# Patient Record
Sex: FEMALE | Race: WHITE | NOT HISPANIC OR LATINO | Employment: UNEMPLOYED | URBAN - METROPOLITAN AREA
[De-identification: names, ages, dates, MRNs, and addresses within clinical notes are randomized per-mention and may not be internally consistent; named-entity substitution may affect disease eponyms.]

---

## 2017-05-16 ENCOUNTER — OFFICE VISIT (OUTPATIENT)
Dept: URGENT CARE | Facility: CLINIC | Age: 27
End: 2017-05-16
Payer: COMMERCIAL

## 2017-05-16 ENCOUNTER — HOSPITAL ENCOUNTER (OUTPATIENT)
Dept: RADIOLOGY | Facility: CLINIC | Age: 27
Discharge: HOME | End: 2017-05-16
Admitting: EMERGENCY MEDICINE
Payer: COMMERCIAL

## 2017-05-16 DIAGNOSIS — R05.9 COUGH: ICD-10-CM

## 2017-05-16 PROCEDURE — 71020 HB CHEST X-RAY 2VW FRONTAL&LATL: CPT

## 2017-05-16 PROCEDURE — G0383 LEV 4 HOSP TYPE B ED VISIT: HCPCS

## 2017-05-17 ENCOUNTER — ALLSCRIPTS OFFICE VISIT (OUTPATIENT)
Dept: OTHER | Facility: OTHER | Age: 27
End: 2017-05-17

## 2017-07-30 ENCOUNTER — OFFICE VISIT (OUTPATIENT)
Dept: URGENT CARE | Facility: CLINIC | Age: 27
End: 2017-07-30
Payer: COMMERCIAL

## 2017-07-30 PROCEDURE — G0382 LEV 3 HOSP TYPE B ED VISIT: HCPCS

## 2017-09-20 ENCOUNTER — GENERIC CONVERSION - ENCOUNTER (OUTPATIENT)
Dept: OTHER | Facility: OTHER | Age: 27
End: 2017-09-20

## 2017-11-06 ENCOUNTER — OFFICE VISIT (OUTPATIENT)
Dept: URGENT CARE | Facility: CLINIC | Age: 27
End: 2017-11-06
Payer: COMMERCIAL

## 2017-11-06 PROCEDURE — G0382 LEV 3 HOSP TYPE B ED VISIT: HCPCS

## 2017-11-13 NOTE — PROGRESS NOTES
Assessment    1  History of acute bronchitis (V12 69) (Z87 09)   2  Acute bronchitis (466 0) (J20 9)    Plan  Acute bronchitis    · Azithromycin 250 MG Oral Tablet (Zithromax Z-Jorden); TAKE 2 TABLETS ON DAY 1THEN TAKE 1 TABLET A DAY FOR 4 DAYS   · ProAir  (90 Base) MCG/ACT Inhalation Aerosol Solution; INHALE 1 TO 2PUFFS EVERY 4 TO 6 HOURS AS NEEDED    Chief Complaint  1  Cough  Chief Complaint Free Text Note Form: pt complaining of chest tightness, cough, scratchy throat 4 days      Active Problems    1  Acute URI (465 9) (J06 9)   2  ADHD (attention deficit hyperactivity disorder), predominantly hyperactive impulsive type (314 01) (F90 1)   3  Anxiety (300 00) (F41 9)   4  Asthma (493 90) (J45 909)   5  Bipolar disorder (296 80) (F31 9)   6  Contraception (V25 9) (Z30 9)   7  Depression (311) (F32 9)   8  Dysmenorrhea (625 3) (N94 6)   9  Menorrhagia with irregular cycle (626 2) (N92 1)   10  PMS (premenstrual syndrome) (625 4) (N94 3)   11  Tinea versicolor (111 0) (B36 0)    Past Medical History  1  History of Anxiety (300 00) (F41 9)   2  History of Denial Of Any Significant Medical History   3  History of Foreign body, eye (930 9) (T15 90XA)   4  History of acute bronchitis (V12 69) (Z87 09)   5  History of acute bronchitis (V12 69) (Z87 09)   6  History of acute sinusitis (V12 69) (Z87 09)   7  History of cervicitis (V13 29) (Z87 42)   8  History of corneal abrasion (V15 59) (Z87 828)   9  History of Spasm of thoracic back muscle (724 8) (M62 830)   10  History of Tinea versicolor (111 0) (B36 0)   11  History of Tinea versicolor (111 0) (B36 0)   12  History of Tinea versicolor (111 0) (B36 0)    Family History  Mother    1  Family history of depression (V17 0) (Z81 8)    Social History   · Former smoker (C33 35) (R90 474)   · Smoking trying to quit (305 1) (Z72 0)    Surgical History    1  Denied: History Of Prior Surgery    Current Meds   1  Amphetamine-Dextroamphetamine 30 MG Oral Tablet;  Take 1 tablet three times daily; Therapy: 51BOI3945 to (Evaluate:16Jun2017); Last Rx:17May2017 Ordered   2  ClonazePAM 1 MG Oral Tablet; TAKE 1 TABLET DAILY AS NEEDED; Last Rx:17May2017 Ordered   3  LamoTRIgine 200 MG Oral Tablet; Take 1 tablet twice daily; Therapy: 19GSZ6753 to (Evaluate:95Ynm5706)  Requested for: 70CBD7681; Last Rx:19Jun2017 Ordered   4  ProAir  (90 Base) MCG/ACT Inhalation Aerosol Solution; INHALE 1 TO 2 PUFFS EVERY 6 HOURS AS NEEDED; Therapy: 74QET0838 to (Last Rx:17May2017) Ordered   5  RisperiDONE 1 MG Oral Tablet; TAKE 1 TABLET Daily TDD:1mg; Therapy: (Recorded:17May2017) to Recorded   6  Selenium Sulfide 2 5 % External Lotion; USE AS DIRECTED ON PACKAGE; Therapy: 53JJD7243 to (Last Clarissa Br)  Requested for: 20ICR8826 Ordered   7  Venlafaxine HCl - 37 5 MG Oral Tablet; TAKE 1 TABLET TWICE DAILY WITH MEALS; Last Rx:17May2017 Ordered    Allergies    1  Codeine Derivatives   2  Penicillins   3   Tramadol    Vitals  Signs   Recorded: 70SPZ0713 11:06AM   Temperature: 98 1 F, Oral  Heart Rate: 85  Respiration: 18  Systolic: 700  Diastolic: 60  Height: 5 ft 5 in  Weight: 145 lb   BMI Calculated: 24 13  BSA Calculated: 1 73  O2 Saturation: 98  Pain Scale: 0    Signatures   Electronically signed by : Anne Marie Chappell NP; Nov 9 2017 10:44AM EST                       (Author)    Electronically signed by : MAKENZIE Newby ; Nov 12 2017 11:06AM EST                       (Co-author)

## 2018-01-13 VITALS
HEIGHT: 65 IN | DIASTOLIC BLOOD PRESSURE: 62 MMHG | WEIGHT: 148 LBS | HEART RATE: 117 BPM | SYSTOLIC BLOOD PRESSURE: 118 MMHG | TEMPERATURE: 97.3 F | RESPIRATION RATE: 20 BRPM | BODY MASS INDEX: 24.66 KG/M2 | OXYGEN SATURATION: 99 %

## 2018-01-22 VITALS
TEMPERATURE: 97.1 F | DIASTOLIC BLOOD PRESSURE: 58 MMHG | BODY MASS INDEX: 24.83 KG/M2 | HEART RATE: 68 BPM | SYSTOLIC BLOOD PRESSURE: 106 MMHG | WEIGHT: 149 LBS | RESPIRATION RATE: 18 BRPM | OXYGEN SATURATION: 99 % | HEIGHT: 65 IN